# Patient Record
Sex: FEMALE | Race: WHITE | NOT HISPANIC OR LATINO | Employment: OTHER | URBAN - METROPOLITAN AREA
[De-identification: names, ages, dates, MRNs, and addresses within clinical notes are randomized per-mention and may not be internally consistent; named-entity substitution may affect disease eponyms.]

---

## 2023-08-25 ENCOUNTER — OFFICE VISIT (OUTPATIENT)
Dept: FAMILY MEDICINE CLINIC | Facility: CLINIC | Age: 78
End: 2023-08-25
Payer: MEDICARE

## 2023-08-25 VITALS
DIASTOLIC BLOOD PRESSURE: 64 MMHG | WEIGHT: 141.2 LBS | HEART RATE: 80 BPM | RESPIRATION RATE: 16 BRPM | BODY MASS INDEX: 27.72 KG/M2 | SYSTOLIC BLOOD PRESSURE: 106 MMHG | OXYGEN SATURATION: 100 % | HEIGHT: 60 IN

## 2023-08-25 DIAGNOSIS — C25.9 PRIMARY PANCREATIC ADENOCARCINOMA: Primary | ICD-10-CM

## 2023-08-25 DIAGNOSIS — Z15.89 MTHFR MUTATION: ICD-10-CM

## 2023-08-25 DIAGNOSIS — I26.99 PULMONARY EMBOLISM, UNSPECIFIED CHRONICITY, UNSPECIFIED PULMONARY EMBOLISM TYPE, UNSPECIFIED WHETHER ACUTE COR PULMONALE PRESENT: Chronic | ICD-10-CM

## 2023-08-25 DIAGNOSIS — R19.7 DIARRHEA, UNSPECIFIED TYPE: ICD-10-CM

## 2023-08-25 DIAGNOSIS — D68.52 PROTHROMBIN GENE MUTATION: ICD-10-CM

## 2023-08-25 PROBLEM — R50.9 FEVER: Status: ACTIVE | Noted: 2022-08-08

## 2023-08-25 PROBLEM — S00.411A EAR CANAL ABRASION, RIGHT, INITIAL ENCOUNTER: Status: ACTIVE | Noted: 2023-02-06

## 2023-08-25 PROBLEM — R73.9 HYPERGLYCEMIA: Status: ACTIVE | Noted: 2022-10-06

## 2023-08-25 PROBLEM — M54.9 DORSALGIA: Status: ACTIVE | Noted: 2023-06-23

## 2023-08-25 PROBLEM — R10.84 GENERALIZED ABDOMINAL PAIN: Status: ACTIVE | Noted: 2023-07-13

## 2023-08-25 PROBLEM — R31.9 HEMATURIA: Status: ACTIVE | Noted: 2023-06-23

## 2023-08-25 PROBLEM — E78.5 HYPERLIPIDEMIA: Status: ACTIVE | Noted: 2022-01-31

## 2023-08-25 PROBLEM — K86.89 PANCREATIC MASS: Status: ACTIVE | Noted: 2023-07-13

## 2023-08-25 PROBLEM — R10.2 SUPRAPUBIC PAIN: Status: ACTIVE | Noted: 2023-05-30

## 2023-08-25 PROBLEM — Z00.00 MEDICARE ANNUAL WELLNESS VISIT, SUBSEQUENT: Status: ACTIVE | Noted: 2020-07-27

## 2023-08-25 PROBLEM — R11.2 NAUSEA AND VOMITING: Status: ACTIVE | Noted: 2023-07-13

## 2023-08-25 PROBLEM — R03.0 ELEVATED BLOOD-PRESSURE READING WITHOUT DIAGNOSIS OF HYPERTENSION: Status: ACTIVE | Noted: 2022-01-31

## 2023-08-25 PROBLEM — R10.9 RIGHT LATERAL ABDOMINAL PAIN: Status: ACTIVE | Noted: 2023-07-01

## 2023-08-25 PROBLEM — R10.13 ABDOMINAL PAIN, EPIGASTRIC: Status: ACTIVE | Noted: 2023-07-13

## 2023-08-25 PROBLEM — N30.01 ACUTE CYSTITIS WITH HEMATURIA: Status: ACTIVE | Noted: 2023-07-01

## 2023-08-25 PROBLEM — R39.9 UTI SYMPTOMS: Status: ACTIVE | Noted: 2019-11-19

## 2023-08-25 PROBLEM — G89.3 CANCER RELATED PAIN: Status: ACTIVE | Noted: 2023-08-04

## 2023-08-25 PROBLEM — R63.4 WEIGHT LOSS: Status: ACTIVE | Noted: 2023-08-04

## 2023-08-25 PROBLEM — I82.409 DEEP VENOUS THROMBOSIS: Chronic | Status: ACTIVE | Noted: 2019-03-20

## 2023-08-25 PROBLEM — Z78.0 POSTMENOPAUSAL: Status: ACTIVE | Noted: 2019-03-25

## 2023-08-25 PROBLEM — Z13.1 SCREENING FOR DIABETES MELLITUS: Status: ACTIVE | Noted: 2020-07-27

## 2023-08-25 PROBLEM — Z13.6 SCREENING FOR ISCHEMIC HEART DISEASE: Status: ACTIVE | Noted: 2020-07-27

## 2023-08-25 PROBLEM — M85.80 OSTEOPENIA: Status: ACTIVE | Noted: 2022-01-31

## 2023-08-25 RX ORDER — HYDROCODONE BITARTRATE AND ACETAMINOPHEN 5; 325 MG/1; MG/1
1 TABLET ORAL EVERY 4 HOURS PRN
COMMUNITY
Start: 2023-07-28

## 2023-08-25 RX ORDER — ATORVASTATIN CALCIUM 10 MG/1
1 TABLET, FILM COATED ORAL DAILY
COMMUNITY
Start: 2023-05-19

## 2023-08-25 RX ORDER — PROMETHAZINE HYDROCHLORIDE 25 MG/1
TABLET ORAL
COMMUNITY
Start: 2023-08-17

## 2023-08-25 RX ORDER — OMEPRAZOLE 20 MG/1
TABLET, DELAYED RELEASE ORAL
COMMUNITY

## 2023-08-25 RX ORDER — LIDOCAINE AND PRILOCAINE 25; 25 MG/G; MG/G
CREAM TOPICAL
COMMUNITY
Start: 2023-08-09

## 2023-08-25 RX ORDER — LANOLIN ALCOHOL/MO/W.PET/CERES
250 CREAM (GRAM) TOPICAL DAILY
COMMUNITY

## 2023-08-25 RX ORDER — DIPHENOXYLATE HYDROCHLORIDE AND ATROPINE SULFATE 2.5; .025 MG/1; MG/1
TABLET ORAL
COMMUNITY
Start: 2023-08-18

## 2023-08-25 NOTE — PROGRESS NOTES
"Chief Complaint  Chief Complaint   Patient presents with    Hyperlipidemia    Pulmonary Embolism     Patient new to the area and is taking Xarelto.  She's established with Dr Castro for cancer treatment and they have been refilling xarelto, but she would like to transfer that medication to us to manage.     Subjective        Анна Vallejo is a 78 y.o. female who presents to [facility].    History of Present Illness  Recent diagnosis of prostate cancer in July 2023.  Being seen by Dr. Phil Castro with [facility] in [place].  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  Symptoms include RUQ pain, diarrhea, poor appetite, weight loss.  She was started on imodium, pancreatic enzymes, and as needed norco q4h .  She is on warfarin and has been for 10+ years d/t MTHFR, prothrombin gene mutation, previous pulmonary emboli in 2010.  This was stopped and she was started on xarelto 20 mg daily.  She is starting Abraxane/gemzar every other week.  Started last week and tolerated okay.  Did have some nausea, has phenergan but it makes her nauseous.  Plan is for 4 chemo sessions every other week, followed by CT scan.  If the cancer is responding then it will be 4 additional chemo sessions.  She is receiving services through [facility] for palliative care and is receiving the norco 5 q4h through them.  She has little appetite.  She has diarrhea.  Prior to pancreatic cancer diagnosis she was on 4 rounds of abx for uti.  The diarrhea has been present since then.  Lomotil did not help.  Lomotil seems to help more.  She takes pancreatic enzymes with her meals.      Objective   /64   Pulse 80   Resp 16   Ht 152.4 cm (60\")   Wt 64 kg (141 lb 3.2 oz)   SpO2 100%   BMI 27.58 kg/mý     Estimated body mass index is 27.58 kg/mý as calculated from the following:    Height as of this encounter: 152.4 cm (60\").    Weight as of this encounter: 64 kg (141 lb 3.2 oz).     Physical " Exam   GEN: In no acute distress, non toxic appearing  HEENT: Pupils equal and reactive to light, sclera clear. Mucous membranes moist. Oropharynx without erythema or exudate. No cervical or submandibular lymphadenopathy.  CV: Regular rate and rhythm, no murmurs, 2+ peripheral pulses, No extremity edema.   RESP: Lungs clear to auscultation anteriorly and posteriorly in all lung fields bilaterally.  NEURO: AAO to person, place, and time. CN 2-12 intact grossly.   PSYCH: Affect normal, insight fair      Result Review :              Assessment and Plan     Diagnoses and all orders for this visit:    1. Primary pancreatic adenocarcinoma (Primary)  Continue care with Dr. Castro.  Receiving Abraxane / Gemzar every other week.  Phenergan for nausea.  Norco for pain through [facility].  Enzymes with meals.    2. MTHFR mutation  Continue xarelto 20 mg daily.    3. Prothrombin gene mutation  See above    4. Pulmonary embolism, unspecified chronicity, unspecified pulmonary embolism type, unspecified whether acute cor pulmonale present  See above    5. Diarrhea, unspecified type  Continue lomotil.  Recommend daily probiotic if previous abx may have contributed.        Follow Up   As needed

## 2023-10-18 DIAGNOSIS — C25.9 PRIMARY PANCREATIC ADENOCARCINOMA: Primary | ICD-10-CM

## 2023-11-10 DIAGNOSIS — C25.9 PRIMARY PANCREATIC ADENOCARCINOMA: Primary | ICD-10-CM

## 2023-11-10 RX ORDER — PANCRELIPASE 36000; 180000; 114000 [USP'U]/1; [USP'U]/1; [USP'U]/1
36000 CAPSULE, DELAYED RELEASE PELLETS ORAL
Qty: 180 CAPSULE | Refills: 3 | Status: SHIPPED | OUTPATIENT
Start: 2023-11-10

## 2023-12-06 NOTE — TELEPHONE ENCOUNTER
Caller: LINDA MANZO    Relationship: Emergency Contact    Best call back number: [phone]     Requested Prescriptions:   Requested Prescriptions     Pending Prescriptions Disp Refills    rivaroxaban (XARELTO) 20 MG tablet 30 tablet      Sig: Take 1 tablet by mouth Daily.        Pharmacy where request should be sent: [facility] [id number] - [place], [place] - [address] - [phone]  - [phone] FX     Last office visit with prescribing clinician: 8/25/2023   Last telemedicine visit with prescribing clinician: Visit date not found   Next office visit with prescribing clinician: Visit date not found     Additional details provided by patient: PATIENT IS REQUESTING THAT DR. FLORES TO TAKE OVER REFILLING THIS MEDICATION. PLEASE CALL AND ADVISE.    Does the patient have less than a 3 day supply:  [x] Yes  [] No    Would you like a call back once the refill request has been completed: [] Yes [] No    If the office needs to give you a call back, can they leave a voicemail: [] Yes [] No    April Dimas [place] Rep   12/06/23 10:10 EST